# Patient Record
Sex: FEMALE | Race: WHITE | NOT HISPANIC OR LATINO
[De-identification: names, ages, dates, MRNs, and addresses within clinical notes are randomized per-mention and may not be internally consistent; named-entity substitution may affect disease eponyms.]

---

## 2021-02-17 ENCOUNTER — APPOINTMENT (OUTPATIENT)
Dept: PLASTIC SURGERY | Facility: CLINIC | Age: 46
End: 2021-02-17
Payer: MEDICAID

## 2021-02-17 VITALS — OXYGEN SATURATION: 97 % | HEART RATE: 68 BPM | HEIGHT: 63 IN | WEIGHT: 127 LBS | BODY MASS INDEX: 22.5 KG/M2

## 2021-02-17 DIAGNOSIS — Z78.9 OTHER SPECIFIED HEALTH STATUS: ICD-10-CM

## 2021-02-17 PROBLEM — Z00.00 ENCOUNTER FOR PREVENTIVE HEALTH EXAMINATION: Status: ACTIVE | Noted: 2021-02-17

## 2021-02-17 PROCEDURE — 99072 ADDL SUPL MATRL&STAF TM PHE: CPT

## 2021-02-17 PROCEDURE — 99204 OFFICE O/P NEW MOD 45 MIN: CPT

## 2021-02-17 NOTE — ASSESSMENT
[FreeTextEntry1] : 47 yo fmeale with right breast cancer for Rt mastectomy and reconstruction.\par \par I reviewed with Ms. MELTON  in detail the risks, benefits, and alternatives of both implant based breast reconstruction as well as autologous tissue reconstruction. \par Specifically, I explained to her that an implant reconstruction usually consists of two separate stages with two surgeries spaced about 4 months apart. At the time of the mastectomy, a tissue expander is placed underneath the pectoralis muscle or on top of the pectoralis muscle and is often reinforced with biologic mesh - acellular dermal matrix. We expand the tissue expander secondarily in the office by injecting saline into the implant percutaneously until the desired size breast mound is achieved. At that point a second stage operation is scheduled where we take her back to operating room and remove the tissue expander and place a permanent breast implant.  The permanent prosthesis can be either silicone or saline.  The first stage of the reconstruction is approximately 3 hours for one breast and 4-5 hours for a bilateral procedure, with a 1-2 night hospital stay and a four-week recovery.  The second stage surgery is an outpatient procedure with a two-week recovery. I reviewed with her the risks of implant reconstruction including but not limited to bleeding, scarring, implant infection, implant rupture, capsule contracture, implant malposition, asymmetry, contour abnormality, and need for revision surgeries. I also discussed the FDA recommendations for silicone implant rupture screening with MRI as well as the details of BII - (breast implant illness) and ALCL. \par \par I then reviewed with CECELIA  the details of autologous tissue reconstruction, specifically using a free flap from her lower abdomen.  This operation entails transplanting the skin, the fat, and blood vessels from the lower abdomen up to the chest wall where we reattach the artery and vein to blood vessels on the chest wall behind the rib to re-vascularize the tissue called a ‘flap’ utilizing microsurgical techniques. We attempt to save all of the muscle fibers of the abdominal rectus muscle to minimize the chance of an abdominal wall weakness, bulge or hernia and only transfer the perforating blood vessels.  This is called a ADRIEL flap.  I explained to her the scar burden associated with this operation including the scars on the breasts and the lower abdomen and around the umbilicus.  I explained to her that there is a risk of free flap loss and vessel thrombosis requiring a return to the operating room for emergent exploration in an attempt to salvage the flap.  If we do lose a flap do to vascular compromise, we would likely place a tissue expander at the time of her returning to the operating room in order to preserve the breast skin envelope and perform a delayed reconstruction.  I reviewed the risks of abdominal wall morbidity including but not limited to abdominal wall weakness, hernia, or bulge. \par \par She does not have adequate tissue to reapproximate the  size of the breasts\par

## 2021-02-17 NOTE — HISTORY OF PRESENT ILLNESS
[FreeTextEntry1] : 47 y/o F with recently diagnosed right breast cancer referred by Dr. Adames presents for initial consultation regarding breast reconstruction following planned right mastectomy. No plans for radiation or chemotherapy. SHe currently wears a 34C size bra.\par \par No family hx of breast cancer. She underwent genetic testing with negative results. \par \par No hx of blood clots or DVTs.

## 2021-02-17 NOTE — CONSULT LETTER
[Consult Letter:] : I had the pleasure of evaluating your patient, [unfilled]. [Please see my note below.] : Please see my note below. [Consult Closing:] : Thank you very much for allowing me to participate in the care of this patient.  If you have any questions, please do not hesitate to contact me. [Sincerely,] : Sincerely, [FreeTextEntry2] : Andre Adames MD\par [address] [place] [address]\par [place]. [place] [address]\par  [FreeTextEntry3] : Oren Lerman, MD, FACS\par Cosmetic & Reconstructive Plastic Surgery\par Director, Aesthetic and Reconstructive Breast Surgery Fellowship - [facility]\par Associate Professor of Surgery, [facility]\par Past President, [place] [employer]\par Tel: [phone]\par Fax: [phone]\par [email or web address]\par

## 2021-02-17 NOTE — REVIEW OF SYSTEMS
[Easy Bleeding] : no tendency for easy bleeding [Easy Bruising] : no tendency for easy bruising [de-identified] : No hx of blood clots or DVT

## 2021-02-17 NOTE — PHYSICAL EXAM
[Bra Size: _______] : Bra Size: [unfilled] [de-identified] : Palpable mass lower inner quadrant of right breast 3cm. SN-N 23.5cm B/L BD 13.5cm BL Grade II ptosis B/L Nipple on the right is malpositioned medially 1.5cm. No nipple discharge, no scars.  [de-identified] : Minimal skin laxity and adiposity

## 2021-02-19 ENCOUNTER — OUTPATIENT (OUTPATIENT)
Dept: OUTPATIENT SERVICES | Facility: HOSPITAL | Age: 46
LOS: 1 days | End: 2021-02-19
Payer: COMMERCIAL

## 2021-02-19 DIAGNOSIS — C50.419 MALIGNANT NEOPLASM OF UPPER-OUTER QUADRANT OF UNSPECIFIED FEMALE BREAST: ICD-10-CM

## 2021-02-19 DIAGNOSIS — C50.519 MALIGNANT NEOPLASM OF LOWER-OUTER QUADRANT OF UNSPECIFIED FEMALE BREAST: ICD-10-CM

## 2021-02-19 DIAGNOSIS — C50.219 MALIGNANT NEOPLASM OF UPPER-INNER QUADRANT OF UNSPECIFIED FEMALE BREAST: ICD-10-CM

## 2021-02-19 DIAGNOSIS — C50.319 MALIGNANT NEOPLASM OF LOWER-INNER QUADRANT OF UNSPECIFIED FEMALE BREAST: ICD-10-CM

## 2021-02-22 ENCOUNTER — RESULT REVIEW (OUTPATIENT)
Age: 46
End: 2021-02-22

## 2021-02-22 LAB — SURGICAL PATHOLOGY STUDY: SIGNIFICANT CHANGE UP

## 2021-02-22 PROCEDURE — 88321 CONSLTJ&REPRT SLD PREP ELSWR: CPT

## 2021-02-23 ENCOUNTER — APPOINTMENT (OUTPATIENT)
Dept: PLASTIC SURGERY | Facility: CLINIC | Age: 46
End: 2021-02-23

## 2021-02-24 ENCOUNTER — TRANSCRIPTION ENCOUNTER (OUTPATIENT)
Age: 46
End: 2021-02-24

## 2021-02-24 ENCOUNTER — APPOINTMENT (OUTPATIENT)
Dept: ULTRASOUND IMAGING | Facility: HOSPITAL | Age: 46
End: 2021-02-24
Payer: MEDICAID

## 2021-02-24 ENCOUNTER — OUTPATIENT (OUTPATIENT)
Dept: OUTPATIENT SERVICES | Facility: HOSPITAL | Age: 46
LOS: 1 days | End: 2021-02-24
Payer: COMMERCIAL

## 2021-02-24 VITALS
HEIGHT: 63 IN | OXYGEN SATURATION: 96 % | HEART RATE: 65 BPM | RESPIRATION RATE: 16 BRPM | SYSTOLIC BLOOD PRESSURE: 105 MMHG | TEMPERATURE: 98 F | DIASTOLIC BLOOD PRESSURE: 68 MMHG | WEIGHT: 130.51 LBS

## 2021-02-24 PROCEDURE — 77065 DX MAMMO INCL CAD UNI: CPT

## 2021-02-24 PROCEDURE — 19285 PERQ DEV BREAST 1ST US IMAG: CPT

## 2021-02-24 PROCEDURE — C1739: CPT

## 2021-02-24 PROCEDURE — 78195 LYMPH SYSTEM IMAGING: CPT

## 2021-02-24 PROCEDURE — 78195 LYMPH SYSTEM IMAGING: CPT | Mod: 26

## 2021-02-24 PROCEDURE — 77065 DX MAMMO INCL CAD UNI: CPT | Mod: 26,RT

## 2021-02-24 PROCEDURE — A9541: CPT

## 2021-02-24 PROCEDURE — 19285 PERQ DEV BREAST 1ST US IMAG: CPT | Mod: RT

## 2021-02-25 ENCOUNTER — INPATIENT (INPATIENT)
Facility: HOSPITAL | Age: 46
LOS: 0 days | Discharge: HOME | DRG: 581 | End: 2021-02-26
Attending: SURGERY | Admitting: SURGERY
Payer: COMMERCIAL

## 2021-02-25 ENCOUNTER — RESULT REVIEW (OUTPATIENT)
Age: 46
End: 2021-02-25

## 2021-02-25 LAB
BLD GP AB SCN SERPL QL: NEGATIVE — SIGNIFICANT CHANGE UP
GLUCOSE BLDC GLUCOMTR-MCNC: 98 MG/DL — SIGNIFICANT CHANGE UP (ref 70–99)
RH IG SCN BLD-IMP: POSITIVE — SIGNIFICANT CHANGE UP

## 2021-02-25 PROCEDURE — 88331 PATH CONSLTJ SURG 1 BLK 1SPC: CPT | Mod: 26

## 2021-02-25 PROCEDURE — 88342 IMHCHEM/IMCYTCHM 1ST ANTB: CPT | Mod: 26,59

## 2021-02-25 PROCEDURE — 19357 TISS XPNDR PLMT BRST RCNSTJ: CPT | Mod: RT

## 2021-02-25 PROCEDURE — 76098 X-RAY EXAM SURGICAL SPECIMEN: CPT | Mod: 26

## 2021-02-25 PROCEDURE — 15777 ACELLULAR DERM MATRIX IMPLT: CPT | Mod: RT,59

## 2021-02-25 PROCEDURE — 88321 CONSLTJ&REPRT SLD PREP ELSWR: CPT

## 2021-02-25 PROCEDURE — 88341 IMHCHEM/IMCYTCHM EA ADD ANTB: CPT | Mod: 26,59

## 2021-02-25 PROCEDURE — 88360 TUMOR IMMUNOHISTOCHEM/MANUAL: CPT | Mod: 26

## 2021-02-25 PROCEDURE — 88307 TISSUE EXAM BY PATHOLOGIST: CPT | Mod: 26

## 2021-02-25 PROCEDURE — 88305 TISSUE EXAM BY PATHOLOGIST: CPT | Mod: 26

## 2021-02-25 RX ORDER — SODIUM CHLORIDE 9 MG/ML
500 INJECTION, SOLUTION INTRAVENOUS ONCE
Refills: 0 | Status: COMPLETED | OUTPATIENT
Start: 2021-02-25 | End: 2021-02-25

## 2021-02-25 RX ORDER — CEFAZOLIN SODIUM 1 G
1000 VIAL (EA) INJECTION EVERY 8 HOURS
Refills: 0 | Status: DISCONTINUED | OUTPATIENT
Start: 2021-02-25 | End: 2021-02-26

## 2021-02-25 RX ORDER — SODIUM CHLORIDE 9 MG/ML
1000 INJECTION, SOLUTION INTRAVENOUS
Refills: 0 | Status: DISCONTINUED | OUTPATIENT
Start: 2021-02-25 | End: 2021-02-26

## 2021-02-25 RX ORDER — OXYCODONE AND ACETAMINOPHEN 5; 325 MG/1; MG/1
1 TABLET ORAL EVERY 6 HOURS
Refills: 0 | Status: DISCONTINUED | OUTPATIENT
Start: 2021-02-25 | End: 2021-02-26

## 2021-02-25 RX ORDER — ACETAMINOPHEN 500 MG
650 TABLET ORAL EVERY 6 HOURS
Refills: 0 | Status: DISCONTINUED | OUTPATIENT
Start: 2021-02-25 | End: 2021-02-26

## 2021-02-25 RX ORDER — HYDROMORPHONE HYDROCHLORIDE 2 MG/ML
0.5 INJECTION INTRAMUSCULAR; INTRAVENOUS; SUBCUTANEOUS EVERY 6 HOURS
Refills: 0 | Status: DISCONTINUED | OUTPATIENT
Start: 2021-02-25 | End: 2021-02-25

## 2021-02-25 RX ORDER — BUPIVACAINE 13.3 MG/ML
20 INJECTION, SUSPENSION, LIPOSOMAL INFILTRATION ONCE
Refills: 0 | Status: DISCONTINUED | OUTPATIENT
Start: 2021-02-25 | End: 2021-02-26

## 2021-02-25 RX ORDER — ENOXAPARIN SODIUM 100 MG/ML
40 INJECTION SUBCUTANEOUS EVERY 24 HOURS
Refills: 0 | Status: DISCONTINUED | OUTPATIENT
Start: 2021-02-25 | End: 2021-02-25

## 2021-02-25 RX ORDER — BUPIVACAINE 13.3 MG/ML
20 INJECTION, SUSPENSION, LIPOSOMAL INFILTRATION ONCE
Refills: 0 | Status: DISCONTINUED | OUTPATIENT
Start: 2021-02-25 | End: 2021-02-25

## 2021-02-25 RX ORDER — ONABOTULINUMTOXINA 100 UNIT
100 VIAL (EA) INJECTION ONCE
Refills: 0 | Status: DISCONTINUED | OUTPATIENT
Start: 2021-02-25 | End: 2021-02-26

## 2021-02-25 RX ORDER — ONDANSETRON 8 MG/1
4 TABLET, FILM COATED ORAL EVERY 6 HOURS
Refills: 0 | Status: DISCONTINUED | OUTPATIENT
Start: 2021-02-25 | End: 2021-02-26

## 2021-02-25 RX ORDER — KETOROLAC TROMETHAMINE 30 MG/ML
15 SYRINGE (ML) INJECTION EVERY 6 HOURS
Refills: 0 | Status: DISCONTINUED | OUTPATIENT
Start: 2021-02-25 | End: 2021-02-26

## 2021-02-25 RX ORDER — OXYCODONE AND ACETAMINOPHEN 5; 325 MG/1; MG/1
2 TABLET ORAL EVERY 6 HOURS
Refills: 0 | Status: DISCONTINUED | OUTPATIENT
Start: 2021-02-25 | End: 2021-02-26

## 2021-02-25 RX ORDER — ENOXAPARIN SODIUM 100 MG/ML
40 INJECTION SUBCUTANEOUS EVERY 24 HOURS
Refills: 0 | Status: DISCONTINUED | OUTPATIENT
Start: 2021-02-26 | End: 2021-02-26

## 2021-02-25 RX ADMIN — SODIUM CHLORIDE 500 MILLILITER(S): 9 INJECTION, SOLUTION INTRAVENOUS at 21:31

## 2021-02-25 RX ADMIN — Medication 15 MILLIGRAM(S): at 23:18

## 2021-02-25 RX ADMIN — Medication 650 MILLIGRAM(S): at 23:19

## 2021-02-25 RX ADMIN — Medication 15 MILLIGRAM(S): at 19:11

## 2021-02-25 NOTE — PROGRESS NOTE ADULT - SUBJECTIVE AND OBJECTIVE BOX
POST-OPERATIVE NOTE    Procedure: Right simple mastectomy with Right SLNB    Diagnosis/Indication: Right breast IDC    Surgeon: Andre Adames    S: Seen and evaluated in PACU. Resting comfortably in bed. Reporting mild HA and numbness in left index Denies any f/c, n/v, CP, SOB,     O:  T(C): 37.2 (02-25-21 @ 18:27), Max: 37.2 (02-25-21 @ 18:27)  T(F): 99 (02-25-21 @ 18:27), Max: 99 (02-25-21 @ 18:27)  HR: 69 (02-25-21 @ 19:27) (56 - 69)  BP: 112/55 (02-25-21 @ 19:27) (103/52 - 117/55)  RR: 21 (02-25-21 @ 19:27) (9 - 21)  SpO2: 95% (02-25-21 @ 19:27) (95% - 100%)  Wt(kg): --            Gen: NAD, resting comfortably in bed  C/V: NSR  Pulm: Nonlabored breathing, no respiratory distress  Abd: soft, NT/ND  Extrem: WWP, no calf edema or tenderness, SCDs in place      A/P: 46y Female s/p above procedure  Diet:  IVF:  Pain/nausea control  SQH/SCDs/OOBA/IS  Dispo pending pain control, PO tolerance, clinical improvement POST-OPERATIVE NOTE    Procedure: Right simple mastectomy with right SLNB    Diagnosis/Indication: Right breast IDC    Surgeon: Andre Adames    S: Seen and evaluated in PACU. Resting comfortably in bed. Reporting mild HA and numbness in left index finger. Denies weakness or pain in extremities. Has not had anything to eat or drink since procedure. Denies any f/c, n/v, CP, SOB, palpitations.     O:  T(C): 37.2 (02-25-21 @ 18:27), Max: 37.2 (02-25-21 @ 18:27)  T(F): 99 (02-25-21 @ 18:27), Max: 99 (02-25-21 @ 18:27)  HR: 69 (02-25-21 @ 19:27) (56 - 69)  BP: 112/55 (02-25-21 @ 19:27) (103/52 - 117/55)  RR: 21 (02-25-21 @ 19:27) (9 - 21)  SpO2: 95% (02-25-21 @ 19:27) (95% - 100%)  Wt(kg): --        Gen: NAD, resting comfortably in bed  C/V: NSR  Pulm: Nonlabored breathing, no respiratory distress  Abd: soft, NT/ND  Extrem: WWP, no calf edema or tenderness, SCDs in place      A/P: 46F PMHx recently diagnosed R breast ER+/MO+/HER2- invasive ductal carcinoma identified on screening mammogram as irregular calcifications presents for elective right mastectomy, sentinel node biopsy and right tissue expander reconstruction (2/25)    Regular/IVF  Ancef (24hrs)  F/u AM labs  Analgesics PRN  Antiemetics PRN  IS/OOB  VTE PPX with SCDs, SQL scheduled for 2/26  Dispo: 23hr observation   POST-OPERATIVE NOTE    Procedure: Right simple mastectomy with right SLNB    Diagnosis/Indication: Right breast IDC    Surgeon: Andre Adames    S: Seen and evaluated in PACU. Resting comfortably in bed. Reporting mild HA and numbness in left index finger. Denies weakness or pain in extremities. Has not had anything to eat or drink since procedure. Denies any f/c, n/v, CP, SOB, palpitations.     O:  T(C): 37.2 (02-25-21 @ 18:27), Max: 37.2 (02-25-21 @ 18:27)  T(F): 99 (02-25-21 @ 18:27), Max: 99 (02-25-21 @ 18:27)  HR: 69 (02-25-21 @ 19:27) (56 - 69)  BP: 112/55 (02-25-21 @ 19:27) (103/52 - 117/55)  RR: 21 (02-25-21 @ 19:27) (9 - 21)  SpO2: 95% (02-25-21 @ 19:27) (95% - 100%)  Wt(kg): --        Gen: NAD, resting comfortably in bed, pale  C/V: NSR  Pulm: Nonlabored breathing, no respiratory distress  Chest: Ace wrapping in place, right axilla soft, without overlying skin changes. ALBA with SSF  Extrem: B/l UE 5/5  strength. 2+ radial pulses intact b/l. Decreased sensation over left index finger. B/l WWP, SCDs in place, no edema.       A/P: 46F PMHx recently diagnosed R breast ER+/[place]+/HER2- invasive ductal carcinoma identified on screening mammogram as irregular calcifications presents for elective right mastectomy, sentinel node biopsy and right tissue expander reconstruction (2/25)    Regular/IVF  Ancef (24hrs)  F/u AM labs  Analgesics PRN  Antiemetics PRN  IS/OOB  VTE PPX with SCDs, SQL scheduled for 2/26  Dispo: 23hr observation

## 2021-02-25 NOTE — H&P ADULT - ASSESSMENT
46F PMHx recently diagnosed R breast ER+/ME+/HER2- invasive ductal carcinoma identified on screening mammogram as irregular calcifications presents for elective right mastectomy, sentinel node biopsy and tissue expander reconstruction.     to OR for above  admit to general surgery Team [facility]  discussed with chief resident, attending surgeon

## 2021-02-25 NOTE — H&P ADULT - HISTORY OF PRESENT ILLNESS
46F PMHx recently diagnosed R breast ER+/[place]+/HER2- invasive ductal carcinoma identified on screening mammogram as irregular calcifications presents for elective right mastectomy, sentinel node biopsy and tissue expander reconstruction. Asymptomatic.

## 2021-02-25 NOTE — BRIEF OPERATIVE NOTE - NSICDXBRIEFPROCEDURE_GEN_ALL_CORE_FT
PROCEDURES:  Insertion, tissue expander 25-Feb-2021 18:26:11  Jimmy Soriano  
PROCEDURES:  Right mastectomy with sentinel lymph node biopsy 25-Feb-2021 16:18:07  Jodi Zeng

## 2021-02-25 NOTE — BRIEF OPERATIVE NOTE - OPERATION/FINDINGS
R pec major was dissected up and implant was placed underneath. Alloderm was used to cover lateral aspect of the tissue expander. Proper hemostasis was observed and incision was closed.
Right simple mastectomy via inframammary fold incision. Portion of skin excised medially for margins. [place] node biopsy with Monica and isosulfan blue dye for navigation, 3 hot and blue nodes removed. Hemostatic at end of case. Please see plastics note for tissue expander placement and closure.

## 2021-02-26 ENCOUNTER — TRANSCRIPTION ENCOUNTER (OUTPATIENT)
Age: 46
End: 2021-02-26

## 2021-02-26 VITALS
TEMPERATURE: 99 F | RESPIRATION RATE: 16 BRPM | OXYGEN SATURATION: 99 % | SYSTOLIC BLOOD PRESSURE: 85 MMHG | DIASTOLIC BLOOD PRESSURE: 42 MMHG | HEART RATE: 67 BPM

## 2021-02-26 PROBLEM — G43.909 MIGRAINE, UNSPECIFIED, NOT INTRACTABLE, WITHOUT STATUS MIGRAINOSUS: Chronic | Status: ACTIVE | Noted: 2021-02-24

## 2021-02-26 PROBLEM — C50.919 MALIGNANT NEOPLASM OF UNSPECIFIED SITE OF UNSPECIFIED FEMALE BREAST: Chronic | Status: ACTIVE | Noted: 2021-02-25

## 2021-02-26 LAB
ANION GAP SERPL CALC-SCNC: 9 MMOL/L — SIGNIFICANT CHANGE UP (ref 5–17)
BUN SERPL-MCNC: 14 MG/DL — SIGNIFICANT CHANGE UP (ref 7–23)
CALCIUM SERPL-MCNC: 8.8 MG/DL — SIGNIFICANT CHANGE UP (ref 8.4–10.5)
CHLORIDE SERPL-SCNC: 103 MMOL/L — SIGNIFICANT CHANGE UP (ref 96–108)
CO2 SERPL-SCNC: 29 MMOL/L — SIGNIFICANT CHANGE UP (ref 22–31)
CREAT SERPL-MCNC: 0.86 MG/DL — SIGNIFICANT CHANGE UP (ref 0.5–1.3)
GLUCOSE SERPL-MCNC: 110 MG/DL — HIGH (ref 70–99)
HCT VFR BLD CALC: 28.6 % — LOW (ref 34.5–45)
HGB BLD-MCNC: 9.3 G/DL — LOW (ref 11.5–15.5)
MAGNESIUM SERPL-MCNC: 1.8 MG/DL — SIGNIFICANT CHANGE UP (ref 1.6–2.6)
MCHC RBC-ENTMCNC: 30.1 PG — SIGNIFICANT CHANGE UP (ref 27–34)
MCHC RBC-ENTMCNC: 32.5 GM/DL — SIGNIFICANT CHANGE UP (ref 32–36)
MCV RBC AUTO: 92.6 FL — SIGNIFICANT CHANGE UP (ref 80–100)
NRBC # BLD: 0 /100 WBCS — SIGNIFICANT CHANGE UP (ref 0–0)
PHOSPHATE SERPL-MCNC: 4.2 MG/DL — SIGNIFICANT CHANGE UP (ref 2.5–4.5)
PLATELET # BLD AUTO: 175 K/UL — SIGNIFICANT CHANGE UP (ref 150–400)
POTASSIUM SERPL-MCNC: 3.7 MMOL/L — SIGNIFICANT CHANGE UP (ref 3.5–5.3)
POTASSIUM SERPL-SCNC: 3.7 MMOL/L — SIGNIFICANT CHANGE UP (ref 3.5–5.3)
RBC # BLD: 3.09 M/UL — LOW (ref 3.8–5.2)
RBC # FLD: 13.1 % — SIGNIFICANT CHANGE UP (ref 10.3–14.5)
SODIUM SERPL-SCNC: 141 MMOL/L — SIGNIFICANT CHANGE UP (ref 135–145)
WBC # BLD: 7.86 K/UL — SIGNIFICANT CHANGE UP (ref 3.8–10.5)
WBC # FLD AUTO: 7.86 K/UL — SIGNIFICANT CHANGE UP (ref 3.8–10.5)

## 2021-02-26 PROCEDURE — 76098 X-RAY EXAM SURGICAL SPECIMEN: CPT

## 2021-02-26 PROCEDURE — 82962 GLUCOSE BLOOD TEST: CPT

## 2021-02-26 PROCEDURE — 84100 ASSAY OF PHOSPHORUS: CPT

## 2021-02-26 PROCEDURE — 86901 BLOOD TYPING SEROLOGIC RH(D): CPT

## 2021-02-26 PROCEDURE — 80048 BASIC METABOLIC PNL TOTAL CA: CPT

## 2021-02-26 PROCEDURE — 88307 TISSUE EXAM BY PATHOLOGIST: CPT

## 2021-02-26 PROCEDURE — 86900 BLOOD TYPING SEROLOGIC ABO: CPT

## 2021-02-26 PROCEDURE — 36415 COLL VENOUS BLD VENIPUNCTURE: CPT

## 2021-02-26 PROCEDURE — 86850 RBC ANTIBODY SCREEN: CPT

## 2021-02-26 PROCEDURE — 88341 IMHCHEM/IMCYTCHM EA ADD ANTB: CPT

## 2021-02-26 PROCEDURE — C1889: CPT

## 2021-02-26 PROCEDURE — 83735 ASSAY OF MAGNESIUM: CPT

## 2021-02-26 PROCEDURE — 88305 TISSUE EXAM BY PATHOLOGIST: CPT

## 2021-02-26 PROCEDURE — 85027 COMPLETE CBC AUTOMATED: CPT

## 2021-02-26 PROCEDURE — 88360 TUMOR IMMUNOHISTOCHEM/MANUAL: CPT

## 2021-02-26 PROCEDURE — 88331 PATH CONSLTJ SURG 1 BLK 1SPC: CPT

## 2021-02-26 RX ORDER — ACETAMINOPHEN 500 MG
2 TABLET ORAL
Qty: 0 | Refills: 0 | DISCHARGE
Start: 2021-02-26

## 2021-02-26 RX ORDER — IBUPROFEN 200 MG
2 TABLET ORAL
Qty: 24 | Refills: 0
Start: 2021-02-26 | End: 2021-02-28

## 2021-02-26 RX ORDER — OXYCODONE HYDROCHLORIDE 5 MG/1
1 TABLET ORAL
Qty: 8 | Refills: 0
Start: 2021-02-26 | End: 2021-02-27

## 2021-02-26 RX ADMIN — Medication 15 MILLIGRAM(S): at 04:57

## 2021-02-26 RX ADMIN — Medication 100 MILLIGRAM(S): at 10:08

## 2021-02-26 RX ADMIN — Medication 15 MILLIGRAM(S): at 11:10

## 2021-02-26 RX ADMIN — Medication 650 MILLIGRAM(S): at 11:10

## 2021-02-26 RX ADMIN — Medication 100 MILLIGRAM(S): at 03:07

## 2021-02-26 RX ADMIN — ENOXAPARIN SODIUM 40 MILLIGRAM(S): 100 INJECTION SUBCUTANEOUS at 04:57

## 2021-02-26 RX ADMIN — OXYCODONE AND ACETAMINOPHEN 1 TABLET(S): 5; 325 TABLET ORAL at 10:10

## 2021-02-26 RX ADMIN — Medication 650 MILLIGRAM(S): at 04:57

## 2021-02-26 NOTE — DISCHARGE NOTE PROVIDER - HOSPITAL COURSE
46F PMHx recently diagnosed R breast ER+/[place]+/HER2- invasive ductal carcinoma identified on screening mammogram as irregular calcifications presents for elective right mastectomy, sentinel node biopsy and tissue expander reconstruction. Asymptomatic. 2/25: right mastectomy, sentinel node, tissue expander tissue expander with alloderm.     46F PMHx recently diagnosed R breast ER+/[place]+/HER2- invasive ductal carcinoma identified on screening mammogram as irregular calcifications presents for elective right mastectomy, sentinel node biopsy and tissue expander reconstruction. Asymptomatic. 2/25: right mastectomy, sentinel node, tissue expander tissue expander with alloderm.  The procedure was uncomplicated and well tolerated by the patient. The post-operative course was uncomplicated. Diet was advanced as tolerated and according to bowel function, and pain was well controlled on medication. On day of discharge, patient had stable vital signs, was tolerating diet, voiding without assistance, and pain was controlled. The patient is to follow up in 1 week with Dr. Lerman and Dr. Adames.

## 2021-02-26 NOTE — DISCHARGE NOTE NURSING/CASE MANAGEMENT/SOCIAL WORK - PATIENT PORTAL LINK FT
You can access the FollowMyHealth Patient Portal offered by [facility] by registering at the following website: [email or web address]. By joining [employer]’s FollowMyHealth portal, you will also be able to view your health information using other applications (apps) compatible with our system.

## 2021-02-26 NOTE — DISCHARGE NOTE PROVIDER - CARE PROVIDER_API CALL
Andre Adames)  Surgery  [address] [place] [address], [address]  [place] [address]  Phone: [phone]  Fax: [phone]  Established Patient  Follow Up Time: 2 weeks

## 2021-02-26 NOTE — DISCHARGE NOTE PROVIDER - NSDCFUADDINST_GEN_ALL_CORE_FT
General Discharge Instructions:  Please resume all regular home medications unless specifically advised not to take a particular medication. Also, please take any new medications as prescribed.  Please get plenty of rest, continue to ambulate several times per day, and drink adequate amounts of fluids. Avoid lifting weights greater than 5-10 lbs until you follow-up with your surgeon, who will instruct you further regarding activity restrictions.  Avoid driving or operating heavy machinery while taking pain medications.  Please follow-up with your surgeon and Primary Care Provider (PCP) as advised.  Incision Care:  *Please call your doctor or nurse practitioner if you have increased pain, swelling, redness, or drainage from the incision site.  *Avoid swimming and baths until your follow-up appointment.  *You may shower, and wash surgical incisions with a mild soap and warm water. Gently pat the area dry.  *If you have staples, they will be removed at your follow-up appointment.  *If you have steri-strips, they will fall off on their own. Please remove any remaining strips 7-10 days after surgery.  Warning Signs:  Please call your doctor or nurse practitioner if you experience the following:  *You experience new chest pain, pressure, squeezing or tightness.  *New or worsening cough, shortness of breath, or wheeze.  *If you are vomiting and cannot keep down fluids or your medications.  *You are getting dehydrated due to continued vomiting, diarrhea, or other reasons. Signs of dehydration include dry mouth, rapid heartbeat, or feeling dizzy or faint when standing.  *You see blood or dark/black material when you vomit or have a bowel movement.  *You experience burning when you urinate, have blood in your urine, or experience a discharge.  *Your pain is not improving within 8-12 hours or is not gone within 24 hours. Call or return immediately if your pain is getting worse, changes location, or moves to your chest or back.  *You have shaking chills, or fever greater than 101.5 degrees Fahrenheit or 38 degrees Celsius.  *Any change in your symptoms, or any new symptoms that concern you.   -Follow up with Plastic & Reconstructive Surgeon, Dr. Lerman in 1 week in the office. Call [phone] for appointment.       -Take Percocet as prescribed for pain control.     -Diet: no restrictions.     -Remove dressings if it has been 72 hours since the operation, then you may start taking showers. Keep incision sites and ALBA drain sites clean & dry after showering.     -No heavy lifting >20 pounds or strenuous/weight bearing exercises.     -Call Doctor’s office or return to ER if: fever (temperature >101.4F), chills, chest pain, shortness of breath, uncontrolled/severe pain, persistent nausea/vomiting, or bleeding/oozing/redness/swelling at incision sites.     Please look at the site every day for signs of infection (increased redness or pain, swelling, odor, yellow or bloody discharge, warm to touch, fever). Maintain suction of the bulb. Note color, consistency, and amount of fluid in the drain. Call the doctor, nurse practitioner, or VNS nurse if the amount increases significantly or changes in character. Be sure to empty the drain frequently. Record the output, if instructed to do so. You may shower; wash the area gently with warm, soapy water. Keep the insertion site clean and dry otherwise. Avoid swimming, baths, hot tubs; do not submerge yourself in water. Make sure to keep the drain attached securely to your body to prevent pulling or dislocation.      General Discharge Instructions:  Please resume all regular home medications unless specifically advised not to take a particular medication. Also, please take any new medications as prescribed.  Please get plenty of rest, continue to ambulate several times per day, and drink adequate amounts of fluids. Avoid lifting weights greater than 5-10 lbs until you follow-up with your surgeon, who will instruct you further regarding activity restrictions.  Avoid driving or operating heavy machinery while taking pain medications.  Please follow-up with your surgeon and Primary Care Provider (PCP) as advised.  Incision Care:  *Please call your doctor or nurse practitioner if you have increased pain, swelling, redness, or drainage from the incision site.  *Avoid swimming and baths until your follow-up appointment.  *You may shower, and wash surgical incisions with a mild soap and warm water. Gently pat the area dry.  *If you have staples, they will be removed at your follow-up appointment.  *If you have steri-strips, they will fall off on their own. Please remove any remaining strips 7-10 days after surgery.  Warning Signs:  Please call your doctor or nurse practitioner if you experience the following:  *You experience new chest pain, pressure, squeezing or tightness.  *New or worsening cough, shortness of breath, or wheeze.  *If you are vomiting and cannot keep down fluids or your medications.  *You are getting dehydrated due to continued vomiting, diarrhea, or other reasons. Signs of dehydration include dry mouth, rapid heartbeat, or feeling dizzy or faint when standing.  *You see blood or dark/black material when you vomit or have a bowel movement.  *You experience burning when you urinate, have blood in your urine, or experience a discharge.  *Your pain is not improving within 8-12 hours or is not gone within 24 hours. Call or return immediately if your pain is getting worse, changes location, or moves to your chest or back.  *You have shaking chills, or fever greater than 101.5 degrees Fahrenheit or 38 degrees Celsius.  *Any change in your symptoms, or any new symptoms that concern you.

## 2021-02-26 NOTE — PROGRESS NOTE ADULT - SUBJECTIVE AND OBJECTIVE BOX
HPI: 46F PMHx recently diagnosed R breast ER+/MI+/HER2- invasive ductal carcinoma identified on screening mammogram as irregular calcifications is now s/p elective right mastectomy, sentinel node biopsy and tissue expander reconstruction on 2/25.    SUBJECTIVE: Pt was seen and examined by chief resident at bedside. Pain is well controlled on the standing toradol/tylenol. No nausea or vomiting. No other complaints at this time.    PAST MEDICAL & SURGICAL HISTORY:  Invasive ductal carcinoma of breast    Migraine    No significant past surgical history      No Known Allergies    MEDICATIONS  (STANDING):  acetaminophen   Tablet .. 650 milliGRAM(s) Oral every 6 hours  BUpivacaine liposome 1.3% Injectable (no eMAR) 20 milliLiter(s) Local Injection once  ceFAZolin   IVPB 1000 milliGRAM(s) IV Intermittent every 8 hours  enoxaparin Injectable 40 milliGRAM(s) SubCutaneous every 24 hours  ketorolac   Injectable 15 milliGRAM(s) IV Push every 6 hours  lactated ringers. 1000 milliLiter(s) (100 mL/Hr) IV Continuous <Continuous>  onabotulinumtoxinA Injectable 100 Unit(s) IntraMuscular once    MEDICATIONS  (PRN):  HYDROmorphone  Injectable 0.5 milliGRAM(s) IV Push every 6 hours PRN breakthrough  ondansetron Injectable 4 milliGRAM(s) IV Push every 6 hours PRN Nausea  oxycodone    5 mG/acetaminophen 325 mG 1 Tablet(s) Oral every 6 hours PRN Moderate Pain (4 - 6)  oxycodone    5 mG/acetaminophen 325 mG 2 Tablet(s) Oral every 6 hours PRN Severe Pain (7 - 10)      Objective    ICU Vital Signs Last 24 Hrs  T(C): 36.8 (26 Feb 2021 05:00), Max: 37.2 (25 Feb 2021 18:27)  T(F): 98.2 (26 Feb 2021 05:00), Max: 99 (25 Feb 2021 18:27)  HR: 76 (26 Feb 2021 06:31) (56 - 76)  BP: 88/44 (26 Feb 2021 06:31) (85/42 - 117/59)  BP(mean): 57 (26 Feb 2021 05:00) (57 - 80)  ABP: --  ABP(mean): --  RR: 15 (26 Feb 2021 06:31) (9 - 21)  SpO2: 96% (26 Feb 2021 06:31) (95% - 100%)      PHYSICAL EXAM:    CONSTITUTIONAL: Well nourished, well developed, NON-DYSMORPHIC, and in no acute distress.    HEAD: normocephalic, atraumatic.  CHEST: R breast with no erythema or fluctuance. Covered with telfa/tegaderm wrapped in ACE bandage.  RESPIRATORY: Respirations unlabored, no increased work of breathing with use of accessory muscles and retractions. No stridor.  CARDIAC: Warm extremities, no cyanosis.               I&O's Summary    25 Feb 2021 07:01  -  26 Feb 2021 07:00  --------------------------------------------------------  IN: 1780 mL / OUT: 1220 mL / NET: 560 mL

## 2021-02-26 NOTE — PROGRESS NOTE ADULT - ASSESSMENT
46F PMHx recently diagnosed R breast ER+/[place]+/HER2- invasive ductal carcinoma identified on screening mammogram as irregular calcifications is now s/p elective right mastectomy, sentinel node biopsy and tissue expander reconstruction on 2/25 - adequately recovering.    - Ancef  - Pain control w/ standing tylenol/toradol  - SCDs  - Reg diet  - Lovenox  - Rest of care per primary
46F PMHx recently diagnosed R breast ER+/ND+/HER2- invasive ductal carcinoma identified on screening mammogram as irregular calcifications presents for elective right mastectomy, sentinel node biopsy and right tissue expander reconstruction (2/25)    Regular/IVF  F/u AM labs  Analgesics PRN  Antiemetics PRN  IS/OOB  VTE PPX with SCDs, SQL   Dispo: 23hr observation  Discharge home today with Keflex, ALBA teaching

## 2021-02-26 NOTE — DISCHARGE NOTE PROVIDER - INSTRUCTIONS
Stay Hydrated:  - Avoid sugary drinks and caffeine   - Drink plenty of water, Gatorade and other fluids low in sugar that has electrolytes. Add soup and broth to your diet.   - drink about 2000 ml or more a day, if you do not have other medical condition that requires fluid restriction.   - You should be voiding clear yellow urine more than 4 times a day.   Please continue your regular diet as tolerated at home. If you are having trouble with a regular diet, please return to a soft diet. Be sure to drink plenty of fluids. It is important that you stay hydrated.

## 2021-02-26 NOTE — DISCHARGE NOTE PROVIDER - NSDCCPCAREPLAN_GEN_ALL_CORE_FT
PRINCIPAL DISCHARGE DIAGNOSIS  Diagnosis: Breast cancer  Assessment and Plan of Treatment: 46F PMHx recently diagnosed R breast ER+/[place]+/HER2- invasive ductal carcinoma identified on screening mammogram as irregular calcifications presents for elective right mastectomy, sentinel node biopsy and tissue expander reconstruction. Asymptomatic.  2/25: right mastectomy, sentinel node, tissue expander tissue expander with alloderm       PRINCIPAL DISCHARGE DIAGNOSIS  Diagnosis: Breast cancer  Assessment and Plan of Treatment: 46F PMHx recently diagnosed R breast ER+/[place]+/HER2- invasive ductal carcinoma identified on screening mammogram as irregular calcifications presents for elective right mastectomy, sentinel node biopsy and tissue expander reconstruction. Asymptomatic. 2/25: right mastectomy, sentinel node, tissue expander tissue expander with alloderm.  The procedure was uncomplicated and well tolerated by the patient. The post-operative course was uncomplicated. Diet was advanced as tolerated and according to bowel function, and pain was well controlled on medication. On day of discharge, patient had stable vital signs, was tolerating diet, voiding without assistance, and pain was controlled. The patient is to follow up in 1 week with Dr. Lerman and Dr. Adames.

## 2021-02-26 NOTE — DISCHARGE NOTE PROVIDER - NSDCACTIVITY_GEN_ALL_CORE
Sex allowed/Showering allowed/Stairs allowed/Walking - Indoors allowed/No heavy lifting/straining/Walking - Outdoors allowed

## 2021-02-26 NOTE — PROGRESS NOTE ADULT - SUBJECTIVE AND OBJECTIVE BOX
SUBJECTIVE: Patient seen and examined bedside by chief resident. Patient reports no pain some soreness at the surgery site, no nausea or vomit, tolerating diet. voiding, no fevers or chills.       ceFAZolin   IVPB 1000 milliGRAM(s) IV Intermittent every 8 hours  enoxaparin Injectable 40 milliGRAM(s) SubCutaneous every 24 hours      Vital Signs Last 24 Hrs  T(C): 36.8 (26 Feb 2021 05:00), Max: 37.2 (25 Feb 2021 18:27)  T(F): 98.2 (26 Feb 2021 05:00), Max: 99 (25 Feb 2021 18:27)  HR: 76 (26 Feb 2021 06:31) (56 - 76)  BP: 88/44 (26 Feb 2021 06:31) (85/42 - 117/59)  BP(mean): 57 (26 Feb 2021 05:00) (57 - 80)  RR: 15 (26 Feb 2021 06:31) (9 - 21)  SpO2: 96% (26 Feb 2021 06:31) (95% - 100%)  I&O's Detail    25 Feb 2021 07:01  -  26 Feb 2021 07:00  --------------------------------------------------------  IN:    Lactated Ringers: 1780 mL  Total IN: 1780 mL    OUT:    Bulb (mL): 145 mL    Estimated Blood Loss (mL): 150 mL    Voided (mL): 925 mL  Total OUT: 1220 mL    Total NET: 560 mL          PHYSICAL EXAMINATION   General: NAD  NEURO:  follows commands.   PULM: nonlabored breathing, no respiratory distress  BREAST: Right mastectomy, axilla soft, dry dressings, no axillary fullness, no hematoma or sings of fluid collection  EXTREM: WWP, no edema, no calf tenderness  VASC: No cyanosis,  no pallor.   PSYCH: Appropriate affect, answers questions appropriately

## 2021-03-01 ENCOUNTER — NON-APPOINTMENT (OUTPATIENT)
Age: 46
End: 2021-03-01

## 2021-03-01 DIAGNOSIS — N63.14 UNSPECIFIED LUMP IN THE RIGHT BREAST, LOWER INNER QUADRANT: ICD-10-CM

## 2021-03-01 DIAGNOSIS — C50.311 MALIGNANT NEOPLASM OF LOWER-INNER QUADRANT OF RIGHT FEMALE BREAST: ICD-10-CM

## 2021-03-01 DIAGNOSIS — C50.919 MALIGNANT NEOPLASM OF UNSPECIFIED SITE OF UNSPECIFIED FEMALE BREAST: ICD-10-CM

## 2021-03-02 ENCOUNTER — APPOINTMENT (OUTPATIENT)
Dept: PLASTIC SURGERY | Facility: CLINIC | Age: 46
End: 2021-03-02
Payer: MEDICAID

## 2021-03-02 VITALS — HEIGHT: 63 IN | OXYGEN SATURATION: 98 % | HEART RATE: 73 BPM

## 2021-03-02 LAB — SURGICAL PATHOLOGY STUDY: SIGNIFICANT CHANGE UP

## 2021-03-02 PROCEDURE — 99024 POSTOP FOLLOW-UP VISIT: CPT

## 2021-03-04 DIAGNOSIS — C50.911 MALIGNANT NEOPLASM OF UNSPECIFIED SITE OF RIGHT FEMALE BREAST: ICD-10-CM

## 2021-03-07 NOTE — PHYSICAL EXAM
[de-identified] : right TE in place, PO ecchymosis, s/p aspiration of 17cc seroma in right breast. ALBA drain removed

## 2021-03-07 NOTE — HISTORY OF PRESENT ILLNESS
[FreeTextEntry1] : 47 y/o F 5 days s/p right mastectomy with TE Recon w Alloderm. She is taking OTC Ibuprofen and Tylenol PRN for pain. She has had a bowel movement since surgery. Denies any fevers.

## 2021-03-07 NOTE — SURGICAL HISTORY
[de-identified] : 2/25/21 - right mastectomy with Submuscular - dual plane TE Recon w Alloderm Sling

## 2021-03-07 NOTE — ASSESSMENT
[FreeTextEntry1] : right TE in place\par right ALBA drain removed\par s/p aspiration of 17cc seroma\par PO ecchymosis\par Rx for PT/OT to start next week\par xeroform applied + dry gauze, continue for 2-3 days then switch to baci/aquaphor\par RTC for first TE next week

## 2021-03-08 ENCOUNTER — NON-APPOINTMENT (OUTPATIENT)
Age: 46
End: 2021-03-08

## 2021-03-09 ENCOUNTER — APPOINTMENT (OUTPATIENT)
Dept: PLASTIC SURGERY | Facility: CLINIC | Age: 46
End: 2021-03-09
Payer: MEDICAID

## 2021-03-09 VITALS — OXYGEN SATURATION: 98 % | WEIGHT: 127 LBS | BODY MASS INDEX: 22.5 KG/M2 | HEIGHT: 63 IN | HEART RATE: 89 BPM

## 2021-03-09 DIAGNOSIS — N64.89 OTHER SPECIFIED DISORDERS OF BREAST: ICD-10-CM

## 2021-03-09 PROCEDURE — 99024 POSTOP FOLLOW-UP VISIT: CPT

## 2021-03-09 PROCEDURE — 10160 PNXR ASPIR ABSC HMTMA BULLA: CPT | Mod: 58

## 2021-03-09 NOTE — PHYSICAL EXAM
[de-identified] : right TE in place, PO ecchymosis, s/p aspiration of 70cc seroma under U/S guidance - right breast

## 2021-03-09 NOTE — PHYSICAL EXAM
[de-identified] : right TE in place, PO ecchymosis, s/p aspiration of 70cc seroma under U/S guidance - right breast

## 2021-03-09 NOTE — PROCEDURE
[FreeTextEntry1] : right breast mastectomy and periprosthetic seroma [FreeTextEntry2] : Tissue expansion and seroma aspiration [FreeTextEntry6] : First - under U/S guidance using standard sterile technique we visualized asn percutaneously aspirated with a 21 guage needle a periprosthetic seroma of 70 cc. \par \par Then After localizing the port of the tissue expander with the magnet I prepped the area with ChloraPrep and then using a new 21 gauge butterfly needle I percutaneously accessed the port of the expander and injected 300cc of injectable saline into the expander the total now on the Right = 300 cc. These are 500 xxx cc expanders. \par \par \par

## 2021-03-09 NOTE — ASSESSMENT
[FreeTextEntry1] : s/p U/S guided aspiration of 70 cc seroma\par Healing well\par Rx for PT/OT to start next week\par dry gauze And baci\par RTC for TE next week

## 2021-03-09 NOTE — HISTORY OF PRESENT ILLNESS
[FreeTextEntry1] : 45 y/o F 12 days s/p right mastectomy with TE Recon w Alloderm. She is taking OTC Ibuprofen and Tylenol PRN for pain. Denies any fevers. She had fluid collection around her right TE which was aspirated last week. She has a 500cc TE in place currently filled with 250cc of air. She has not started PT/OT.\par \par She c/o numbness in her right axilla and states sometimes it radiates to her right hand. She has hired a private nurse to do wound changes.

## 2021-03-09 NOTE — HISTORY OF PRESENT ILLNESS
[FreeTextEntry1] : 47 y/o F 12 days s/p right mastectomy with TE Recon w Alloderm. She is taking OTC Ibuprofen and Tylenol PRN for pain. Denies any fevers. She had fluid collection around her right TE which was aspirated last week. She has a 500cc TE in place currently filled with 250cc of air. She has not started PT/OT.\par \par She c/o numbness in her right axilla and states sometimes it radiates to her right hand. She has hired a private nurse to do wound changes.

## 2021-03-16 ENCOUNTER — APPOINTMENT (OUTPATIENT)
Dept: PLASTIC SURGERY | Facility: CLINIC | Age: 46
End: 2021-03-16
Payer: MEDICAID

## 2021-03-16 PROCEDURE — 99024 POSTOP FOLLOW-UP VISIT: CPT

## 2021-03-16 NOTE — PROCEDURE
[FreeTextEntry1] : right breast mastectomy and periprosthetic seroma [FreeTextEntry2] : Tissue expansion and seroma aspiration [FreeTextEntry6] : After localizing the port of the tissue expander with the magnet I prepped the area with ChloraPrep and then using a new 21 gauge butterfly needle I percutaneously accessed the port of the expander and injected 90cc of injectable saline into the expander the total now on the Right = 390 cc. These are 500 cc expanders. \par \par \par

## 2021-03-16 NOTE — ASSESSMENT
[FreeTextEntry1] : 90 cc saline - right TE - total =390 (500cc expanders)\par Healing well\par Continue PT/OT\par \par RTC in 1 month

## 2021-03-16 NOTE — HISTORY OF PRESENT ILLNESS
[FreeTextEntry1] : 47 y/o F 19 days s/p right mastectomy with TE Recon w Alloderm. She is taking OTC Ibuprofen and Tylenol PRN for pain. Denies any fevers. She had fluid collection around her right TE which was aspirated last week. She has a 500cc TE in place. She has started PT/OT twice per week on the 5th floor of [facility] with Narcisa. \par \par She c/o numbness in her right axilla and states sometimes it radiates to her right hand.

## 2021-03-23 ENCOUNTER — APPOINTMENT (OUTPATIENT)
Dept: PLASTIC SURGERY | Facility: CLINIC | Age: 46
End: 2021-03-23
Payer: MEDICAID

## 2021-03-23 VITALS — HEART RATE: 95 BPM | WEIGHT: 127 LBS | OXYGEN SATURATION: 98 % | HEIGHT: 63 IN | BODY MASS INDEX: 22.5 KG/M2

## 2021-03-23 PROCEDURE — 99072 ADDL SUPL MATRL&STAF TM PHE: CPT

## 2021-03-23 PROCEDURE — 99212 OFFICE O/P EST SF 10 MIN: CPT

## 2021-03-23 NOTE — ASSESSMENT
[FreeTextEntry1] : reassured patient that she is healing well. Encouraged use of NSAIDS, Tylenol and OT/Pt. Discussed possibly deflating expander if continued pain but she doesn’t want that at this time.\par \par Continue PT/OT\par Reviewed pain medication regimen w/ tylenol/ibuprofen\par cautioned her very stringently about care using Warm compresses not to burn herself because mastectomy skin is numb without pain or thermal feedback and more easily injured and susceptible to thermal injury \par \par RTC in 1 month

## 2021-03-23 NOTE — HISTORY OF PRESENT ILLNESS
[FreeTextEntry1] : 45 y/o F 1 month s/p right mastectomy with TE Recon w Alloderm. She is taking OTC Ibuprofen and Tylenol PRN for pain. Denies any fevers. She has started PT/OT twice per week on the 5th floor [facility] with Narcisa. \par \par Today she c/o pain in her right breast following her last expansion. She is unable to do her PT exercises due to the pain. She also c/o swelling in her right arm.\par \par She c/o numbness in her right axilla and states sometimes it radiates to her right hand.

## 2021-04-20 ENCOUNTER — APPOINTMENT (OUTPATIENT)
Dept: PLASTIC SURGERY | Facility: CLINIC | Age: 46
End: 2021-04-20
Payer: MEDICAID

## 2021-04-20 VITALS — HEIGHT: 63 IN | WEIGHT: 127 LBS | BODY MASS INDEX: 22.5 KG/M2 | OXYGEN SATURATION: 98 % | HEART RATE: 91 BPM

## 2021-04-20 PROCEDURE — 99024 POSTOP FOLLOW-UP VISIT: CPT

## 2021-04-20 NOTE — PHYSICAL EXAM
[de-identified] : right TE in place, healing well, no fluid collection, no infection, healing well

## 2021-04-20 NOTE — ASSESSMENT
[FreeTextEntry1] : reassured patient that she is healing well. Encouraged use of NSAIDS, Tylenol and OT/Pt.\par Continue PT/OT\par \par Discussed second stage revision w/ left mastopexy without implant vs with implant for upper pole fullness. She does not want left breast implant. \par \par RTC in 1 month

## 2021-04-20 NOTE — PROCEDURE
[Nl] : None [FreeTextEntry2] : TE expansion  [FreeTextEntry6] : After localizing the port of the tissue expander with the magnet, I prepped the area with chloroprep and then using a 21 gauge butterfly needle, I percutaneously accessed the port of the expander and injected 60 cc of injectable saline into the expander. \par \par Totals:\par Right: 450 cc\par \par \par These are 500 cc expanders.\par \par \par \par \par

## 2021-04-20 NOTE — HISTORY OF PRESENT ILLNESS
[FreeTextEntry1] : 47 y/o F 2 months s/p right mastectomy with TE Recon w Alloderm. She is taking OTC Ibuprofen and Tylenol PRN for pain. Denies any fevers. She has started PT/OT twice per week on the 5th floor of [facility] with Deneen. \par \par Today she still has some occasional pain in her right breast following her last expansion, but it has improved significantly. She denies any pain today. She does not want an expansion today.

## 2021-05-25 ENCOUNTER — APPOINTMENT (OUTPATIENT)
Dept: PLASTIC SURGERY | Facility: CLINIC | Age: 46
End: 2021-05-25
Payer: MEDICAID

## 2021-05-25 VITALS — WEIGHT: 127 LBS | BODY MASS INDEX: 22.5 KG/M2 | HEIGHT: 63 IN | OXYGEN SATURATION: 98 % | HEART RATE: 90 BPM

## 2021-05-25 PROCEDURE — 99024 POSTOP FOLLOW-UP VISIT: CPT

## 2021-05-25 NOTE — ASSESSMENT
[FreeTextEntry1] : Well healed\par 450/500 cc right TE \par Continue PT/OT\par \par Discussed second stage recon with TE exchange --> silicone implant. Left mastopexy for symmetry (with or without implant) She does not want implant on the left and wants to be smaller than her current TE size. Without implant, right TE exchange, possible fat grafting to right upper pole \par \par Cannot adjust nipple position - discussed possibly excising and reconstructing in a more anatomical location - she wants to leave as is for now.\par \par \par She inquired about rhinoplasty and Botox which we can offer her in the future.

## 2021-05-25 NOTE — PHYSICAL EXAM
[de-identified] : right TE in place, healing well, no fluid collection, no infection - fully expanded. Because of breast skin excision from lower inner pole the NAC is displaced toward the lower inner quadrant. Left native breast with grade I Ptosis and lack of upper pole fullness.

## 2021-05-25 NOTE — HISTORY OF PRESENT ILLNESS
[FreeTextEntry1] : 47 y/o F 3 months s/p right mastectomy with TE Recon w Alloderm. Denies any fevers. She has started PT/OT twice per week on the 5th floor of [facility] with Deneen. She is here to assess readiness for her revision surgery.

## 2021-06-04 ENCOUNTER — APPOINTMENT (OUTPATIENT)
Dept: PLASTIC SURGERY | Facility: CLINIC | Age: 46
End: 2021-06-04
Payer: MEDICAID

## 2021-06-04 PROCEDURE — 99202 OFFICE O/P NEW SF 15 MIN: CPT | Mod: 95

## 2021-06-04 NOTE — HISTORY OF PRESENT ILLNESS
[Home] : at home, [unfilled] , at the time of the visit. [Medical Office: ([facility])___] : at the medical office located in  [Verbal consent obtained from patient] : the patient, [unfilled] [FreeTextEntry1] : Patient Name: CECELIA MELTON \par : 1975 \par Date: 2021 \par Attending: Dr. Oren Lerman\par \par HPI: pre-op consultation for left mastopexy, right implant exchange and fat grafting to right upper pole on 21. \par \par Medication prescribed: patient declined, states she has Rx medication left from last surgery\par \par ROS: complete 14 point review of systems negative except pertinent items reviewed in the HPI. Other non-contributory items reviewed in our new patient questionnaire and we have submitted it to be scanned into the medical record. \par \par Physical Exam: \par completed at time of in office evaluation \par \par Assessment/Plan:\par We have discussed pre and postop instructions, recovery limitations, restrictions and expectations, ERAS protocol, NPO status, transportation home, postop medications, COVID-19 policies, benefits and risks of the procedure. The patient would like to proceed with surgery as scheduled.\par \par HORTENSIA BUTLER NP\par \par

## 2021-06-08 ENCOUNTER — LABORATORY RESULT (OUTPATIENT)
Age: 46
End: 2021-06-08

## 2021-06-10 ENCOUNTER — TRANSCRIPTION ENCOUNTER (OUTPATIENT)
Age: 46
End: 2021-06-10

## 2021-06-11 ENCOUNTER — RESULT REVIEW (OUTPATIENT)
Age: 46
End: 2021-06-11

## 2021-06-11 ENCOUNTER — OUTPATIENT (OUTPATIENT)
Dept: OUTPATIENT SERVICES | Facility: HOSPITAL | Age: 46
LOS: 1 days | Discharge: HOME | End: 2021-06-11
Payer: MEDICAID

## 2021-06-11 PROCEDURE — 19342 INSJ/RPLCMT BRST IMPLT SEP D: CPT | Mod: RT

## 2021-06-11 PROCEDURE — 88300 SURGICAL PATH GROSS: CPT | Mod: 26,59

## 2021-06-11 PROCEDURE — 88305 TISSUE EXAM BY PATHOLOGIST: CPT | Mod: 26

## 2021-06-11 PROCEDURE — 19316 MASTOPEXY: CPT | Mod: LT

## 2021-06-11 PROCEDURE — 19371 PERI-IMPLT CAPSLC BRST COMPL: CPT | Mod: RT,59

## 2021-06-11 PROCEDURE — 15771 GRFG AUTOL FAT LIPO 50 CC/<: CPT | Mod: 59

## 2021-06-15 ENCOUNTER — APPOINTMENT (OUTPATIENT)
Dept: PLASTIC SURGERY | Facility: CLINIC | Age: 46
End: 2021-06-15
Payer: MEDICAID

## 2021-06-15 DIAGNOSIS — C50.911 MALIGNANT NEOPLASM OF UNSPECIFIED SITE OF RIGHT FEMALE BREAST: ICD-10-CM

## 2021-06-15 PROCEDURE — 99024 POSTOP FOLLOW-UP VISIT: CPT

## 2021-06-15 NOTE — SURGICAL HISTORY
[de-identified] : 6/11/21 - left mastopexy autoaugmentation, right implant [id number] SSF, and fat grafting to right breast from abdomen and hips/flanks [de-identified] : 2/25/21 - right mastectomy with TE Recon w Alloderm

## 2021-06-15 NOTE — ASSESSMENT
[FreeTextEntry1] : Healing well\par PO care reviewed\par Sutures removed from lipo sites\par F/U in 1 month

## 2021-06-15 NOTE — PHYSICAL EXAM
[de-identified] : Rt implant in good position, skin pink , incisions C/D/I, no collecitons. Left breast mound soft, NAC viable, incisions C/D/I, Good contour and symmetry [de-identified] : soft, No collections or infection, good contour, mild echymosis flanks and hips

## 2021-06-15 NOTE — HISTORY OF PRESENT ILLNESS
[FreeTextEntry1] : 47 y/o F 4 days s/p left mastopexy, right implant exchange, and fat grafting to right breast from abdomen and flanks, happy with results, no complaints, no f/c/n/v/d.

## 2021-06-22 LAB — SURGICAL PATHOLOGY STUDY: SIGNIFICANT CHANGE UP

## 2021-06-29 ENCOUNTER — APPOINTMENT (OUTPATIENT)
Dept: PLASTIC SURGERY | Facility: CLINIC | Age: 46
End: 2021-06-29
Payer: MEDICAID

## 2021-06-29 VITALS — WEIGHT: 127 LBS | HEART RATE: 86 BPM | OXYGEN SATURATION: 98 % | BODY MASS INDEX: 22.5 KG/M2 | HEIGHT: 63 IN

## 2021-06-29 PROCEDURE — 99024 POSTOP FOLLOW-UP VISIT: CPT

## 2021-07-06 NOTE — HISTORY OF PRESENT ILLNESS
[FreeTextEntry1] : 47 y/o F 18 days s/p left mastopexy, right implant exchange, and fat grafting to right breast from abdomen and flanks. Today she c/o right breast pain. States the pain started 3 days ago when she was getting up out of bed. The pain has been improving but she feels like her "implant is moving". Denies f/c/n/v/d.

## 2021-07-06 NOTE — SURGICAL HISTORY
[de-identified] : 2/25/21 - right mastectomy with TE Recon w Alloderm [de-identified] : 6/11/21 - left mastopexy autoaugmentation, right implant [id number] SSF, and fat grafting to right breast from abdomen and hips/flanks

## 2021-07-06 NOTE — PHYSICAL EXAM
[de-identified] : Rt implant in good position, skin pink , incisions C/D/I, no collections. Left breast mound soft, NAC viable [de-identified] : soft, No collections or infection, good contour

## 2021-07-06 NOTE — ASSESSMENT
[FreeTextEntry1] : Healing well. No sign of any implant malposition or complication. Her pain from recent activity could be a torn suture but it should resolve on its own. no surgical intervention is indicated. \par PO care reviewed\par \par F/U in 1 month

## 2021-07-09 ENCOUNTER — APPOINTMENT (OUTPATIENT)
Dept: PLASTIC SURGERY | Facility: CLINIC | Age: 46
End: 2021-07-09
Payer: MEDICAID

## 2021-07-09 PROCEDURE — 99024 POSTOP FOLLOW-UP VISIT: CPT

## 2021-07-09 NOTE — HISTORY OF PRESENT ILLNESS
[FreeTextEntry1] : 47 y/o F 18 days s/p left mastopexy, right implant exchange, and fat grafting to right breast from abdomen and flanks. Today she c/o that she can still feel her "implant is moving". Denies f/c/n/v/d. Denies pain but states that is uncomfortable and she says it is impeding movement of her right arm. English

## 2021-07-09 NOTE — ASSESSMENT
[FreeTextEntry1] : Healing well. No sign of any implant malposition or complication. Her pain from recent activity could be referred pain. No surgical intervention is indicated. \par \par PO care reviewed\par Rx for PT given, encouraged patient to see PT\par \par F/U in 1 month

## 2021-07-09 NOTE — SURGICAL HISTORY
[de-identified] : 2/25/21 - right mastectomy with TE Recon w Alloderm [de-identified] : 6/11/21 - left mastopexy autoaugmentation, right implant exchange 385cc SSF, and fat grafting to right breast from abdomen and hips/flanks

## 2021-07-09 NOTE — PHYSICAL EXAM
[de-identified] : Rt implant in good position, skin pink , incisions healing well, no collections. Left breast mound soft, NAC viable

## 2021-09-13 ENCOUNTER — NON-APPOINTMENT (OUTPATIENT)
Age: 46
End: 2021-09-13

## 2021-09-14 ENCOUNTER — APPOINTMENT (OUTPATIENT)
Dept: PLASTIC SURGERY | Facility: CLINIC | Age: 46
End: 2021-09-14
Payer: MEDICAID

## 2021-09-14 VITALS — WEIGHT: 130 LBS | HEART RATE: 81 BPM | BODY MASS INDEX: 23.04 KG/M2 | HEIGHT: 63 IN | OXYGEN SATURATION: 98 %

## 2021-09-14 PROCEDURE — 99215 OFFICE O/P EST HI 40 MIN: CPT

## 2021-09-22 NOTE — PHYSICAL EXAM
[de-identified] : Rt implant in good position, skin pink , incisions healing well, no collections. the implant is not shifting or herniating out of the pocket. Left breast mound soft, NAC viable.

## 2021-09-22 NOTE — HISTORY OF PRESENT ILLNESS
[FreeTextEntry1] : 45 y/o F 3 months s/p left mastopexy, right implant exchange, and fat grafting to right breast from abdomen and flanks. \par \par Denies f/c/n/v/d. Patient reports persistent discomfort of her right breast and she says it is impeding movement of her right arm. She read online that this is due to pectoralis muscle animation deformity or implant malposition, She reports the discomfort is on the lateral breast mound / chest wall. \par \par She sees where the animation deformity is when she moves her arm but she specifically feels that the discomfort is along the lateral breast wall at the upper outer quadrant. After explaining what animation deformity is she understands that this is not what is bothering her.    \par \par

## 2021-09-22 NOTE — SURGICAL HISTORY
[de-identified] : 2/25/21 - right mastectomy with TE Recon w Alloderm [de-identified] : 6/11/21 - left mastopexy autoaugmentation, right implant exchange 385cc SSF, and fat grafting to right breast from abdomen and hips/flanks

## 2021-09-22 NOTE — ASSESSMENT
[FreeTextEntry1] : Rima has persistent pain and decreased ROM - I have advised her to go for PT and she has not yet done so - we gave her prescription. On exam the implant is in good position and well healed. Her discomfort can be secondary to nonspecific postmastectomy pain syndrome, it could be related to capsule formation or scar tissue from the surgery, it could be irritation from the prosthesis on the subpectoral position of the implant. None of these will be improved with implant exchange and there is no indication for capsulorraphy as her implant in in good position and not moving or herniating out of position with arm motion or body position. She feels that this was all secondary to a "pop" or tearing she felt in the lateral breast mound  / axilla during the healing phase and I explained that even if a suture gave way at that time it has healed normally with good position. \par \par It is not exceedingly rare for some patients to have persistent discomfort after Mastectomy and prosthetic reconstruction and it is often attributable to the scar tissue / capsule formation, subpectoral placement of the implant. Usually it improves with time and PT. At the very least she will need a ~6 months to recover and follow through with physical therapy. \par \par If she has persistent pain implant removal without reconstruction should be considered, alternative options include implant reposition to the prepectoral plane or implant removal and autologous reconstruction with ADRIEL flap. She is currently too thin for this option and was never a good candidate for that surgery. \par \par I spent 40 minutes explaining why implant exchange will accomplish nothing and is un-indicated and there is no role for exploration and capsulorraphy because there is no malposition.  She understands her options - she seems interested in gaining weight and potentially performing ADRIEL flap. \par \par She needs OT/PT\par F/U in 3 months

## 2021-12-14 ENCOUNTER — APPOINTMENT (OUTPATIENT)
Dept: PLASTIC SURGERY | Facility: CLINIC | Age: 46
End: 2021-12-14
Payer: MEDICAID

## 2021-12-14 VITALS — HEART RATE: 83 BPM | BODY MASS INDEX: 23.04 KG/M2 | WEIGHT: 130 LBS | OXYGEN SATURATION: 97 % | HEIGHT: 63 IN

## 2021-12-14 PROCEDURE — 99214 OFFICE O/P EST MOD 30 MIN: CPT

## 2021-12-20 NOTE — PHYSICAL EXAM
[de-identified] : Rt implant in good position, skin pink , incisions healing well, no collections. the implant is not shifting or herniating out of the pocket. Left breast mound soft, NAC viable. right NAC displaced [de-identified] : NT, ND, no masses, no scars, adequate tissue for autologous donor site, +skin laxity, +adipose tissue. \par

## 2021-12-20 NOTE — ASSESSMENT
[FreeTextEntry1] : Rima has persistent pain and decreased ROM.She completed PT. On exam the implant is in good position and well healed. We reviewed again the options for revision. She asking again for simple implant exchange and I explained why implant exchange alone will not alter her discomfort in my opinion and I would not offer it. \par \par We have previously discussed that If she has persistent pain, implant removal without reconstruction should be considered, alternative options that we discussed implant reposition to the prepectoral plane or implant removal and autologous reconstruction with ADRIEL flap. She expressed clearly that she does not want autologous breast recon because of the donor site morbidity, risks and recovery. \par \par i again offered her implant repositioning to prepectoral plane I explained the risks of implant recon including malposition, asymmetry need for revision surgery and the fact that implants are not intended to last a lifetime.\par \par She will consider her option and follow up with us when she makes a decision.

## 2021-12-20 NOTE — SURGICAL HISTORY
[de-identified] : 2/25/21 - right mastectomy with TE Recon w Alloderm [de-identified] : 6/11/21 - left mastopexy autoaugmentation, right implant exchange 385cc SSF, and fat grafting to right breast from abdomen and hips/flanks

## 2021-12-20 NOTE — HISTORY OF PRESENT ILLNESS
[FreeTextEntry1] : 45 y/o F 6 months s/p left mastopexy, right implant exchange, and fat grafting to right breast from abdomen and flanks. Denies f/c/n/v/d. Patient reports persistent discomfort of her right breast and she says it is impeding movement of her right arm. She went to physical therapy and finished last week. \par \par

## 2022-03-15 ENCOUNTER — APPOINTMENT (OUTPATIENT)
Dept: PLASTIC SURGERY | Facility: CLINIC | Age: 47
End: 2022-03-15
Payer: MEDICAID

## 2022-04-26 ENCOUNTER — APPOINTMENT (OUTPATIENT)
Dept: PLASTIC SURGERY | Facility: CLINIC | Age: 47
End: 2022-04-26
Payer: MEDICAID

## 2022-04-26 VITALS — BODY MASS INDEX: 24.27 KG/M2 | HEIGHT: 63 IN | WEIGHT: 137 LBS

## 2022-04-26 DIAGNOSIS — Z90.11 ACQUIRED ABSENCE OF RIGHT BREAST AND NIPPLE: ICD-10-CM

## 2022-04-26 DIAGNOSIS — Z85.3 PERSONAL HISTORY OF MALIGNANT NEOPLASM OF BREAST: ICD-10-CM

## 2022-04-26 PROCEDURE — 99212 OFFICE O/P EST SF 10 MIN: CPT

## 2022-04-26 NOTE — HISTORY OF PRESENT ILLNESS
[FreeTextEntry1] : 48 y/o female s/p left mastopexy, right implant exchange, and fat grafting to right breast from abdomen and flanks on 06/11/21. Denies f/c/n/v/d. Patient reports persistent discomfort of her right breast and she says it is impeding movement of her right arm. Patient states PT didn’t help with the discomfort.\par \par She has been offered multiple options for revision. She expressed today that she "does not trust me to treat her moving forward" and that she is going to another surgeon. \par \par I explained that I was willing to proceed with surgery if she wanted and she again said that she did not want to have me treat her any further. I offered to refer her to another physician for further treatment but she declined. \par \par She expressed that she made the appointment today to let me know that I needed to convince her not to send in a negative review of me online. \par \par She expressed to the nurse practitioner that she is afflicted and "the same thing happens to her"\par \par She no longer needs any follow up appointments as she is healed and she has declined any further treatment from us. We will be discharging her from our care.

## 2022-04-26 NOTE — SURGICAL HISTORY
[de-identified] : 2/25/21 - right mastectomy with TE Recon w Alloderm [de-identified] : 6/11/21 - left mastopexy autoaugmentation, right implant exchange 385cc SSF, and fat grafting to right breast from abdomen and hips/flanks

## 2022-10-04 NOTE — PATIENT PROFILE ADULT - NSPROIMPLANTSMEDDEV_GEN_A_NUR
Area L Indication Text: Tumors in this location are included in Area L (trunk and extremities).  Mohs surgery is indicated for larger tumors, or tumors with aggressive histologic features, in these anatomic locations. None

## 2024-04-09 NOTE — PRE-OP CHECKLIST - PATIENT'S PERSONAL PROPERTY GIVEN TO
RL Valverde MD, [facility] ENDOCRINOLOGY   AND   [facility]            Reason for visit: Kelly Paris is referred by Danny Rider MD for the evaluation and management of a prolactinoma.        ASSESSMENT AND PLAN:    1. Prolactinoma (HCC)  I will check prolactin today.  She is interested in using the lowest effective dose.  Goal of therapy is control of symptoms.  - cabergoline (DOSTINEX) 0.5 MG tablet; Take 0.5 tablets by mouth Twice a Week  Dispense: 4 tablet; Refill: 1  - Prolactin; Future  - Prolactin; Future    2. Polycystic ovarian syndrome  She has been diagnosed with PCOS, but I see no evidence of it at present.      Follow-up and Dispositions    Return in about 6 months (around 10/9/2024).         History of Present Illness:    PITUITARY DISEASE  Kelly Paris is here for new evaluation and treatment of a prolactinoma.  She was diagnosed with a 4 mm pituitary adenoma (prolactinoma) in  as part of the evaluation of secondary amenorrhea.  This was previously managed by Lucas Mcgrath and Devon Ortiz, both with [facility].    Symptoms: See review of systems below    Obstetrics history: , no history of infertility, no plans for more pregnancy ( had vasectomy)    Menses: regular (every 28 days)    Prior/current treatment: Cabergoline was started in 2014.  This was discontinued in spring 2015 upon diagnosis of pregnancy.  Cabergoline was resumed upon completion of pregnancy and lactation.  It was discontinued and , but hyperprolactinemia persisted, so she resumed shortly thereafter.  She has taken 0.25 mg twice weekly since  or .    Laboratory evaluation:  3/31/2017 at 12:08 PM: Prolactin 80.51, IGF-1 207, TSH 1.150, free T4 1.25, cortisol 6  7/10/2017: Prolactin 9.3.  10/17/2017: Prolactin 10.2.  2018: Prolactin 14.5.  2019: Prolactin 50.4, TSH 1.950, LH 9.0, FSH 4.3.  2019: Prolactin  security/safe

## 2025-04-24 NOTE — PATIENT PROFILE ADULT - OVER THE PAST TWO WEEKS, HAVE YOU FELT LITTLE INTEREST OR PLEASURE IN DOING THINGS?
"  Medication Question or Refill    Contacts       Contact Date/Time Type Contact Phone/Fax    04/24/2025 08:38 AM CDT Phone (Incoming) Liza Goins (Self) [phone] (M)            What medication are you calling about (include dose and sig)?:     Semaglutide-Weight Management (WEGOVY) 1.7 MG/0.75ML pen         Controlled Substance Agreement on file:   CSA -- Patient Level:    CSA: None found at the patient level.       Who prescribed the medication?: Cortes    Do you have any questions or concerns?  Yes:     Please call pt; denied next round 'hasn't lost enough weight: not true\"      Okay to leave a detailed message?: Yes at Cell number on file:    Telephone Information:   Mobile [phone]       " no